# Patient Record
Sex: MALE | Race: WHITE | NOT HISPANIC OR LATINO | Employment: OTHER | URBAN - METROPOLITAN AREA
[De-identification: names, ages, dates, MRNs, and addresses within clinical notes are randomized per-mention and may not be internally consistent; named-entity substitution may affect disease eponyms.]

---

## 2017-01-06 NOTE — PATIENT DISCUSSION
Continue: prednisolone acetate (prednisolone acetate): drops,suspension: 1% 1 drop four times a day into affected eye 12-

## 2017-01-06 NOTE — PATIENT DISCUSSION
RETINA IS ATTACHED OU: NO HOLES OR TEARS SEEN ON  EXAM TODAY.  RETINAL DETACHMENT SIGNS AND SYMPTOMS REVIEWED

## 2017-01-06 NOTE — PATIENT DISCUSSION
POST-OP DAY 1 EXAM: s/p PPV/MEMBRANE PEELING for ERM OS. Doing well today. Retina attached. IOP within acceptable limits. Start eyedrops in the surgical eye as instructed: Pred 4x/day, Cipro 4x/day, Atropine 2x/day. Take tylenol or ibuprofen for any mild eye pain or pressure. Retinal detachment and endophthalmitis precautions reviewed. Instructed to call immediately for worsening vision, eye pain, or eye discharge.

## 2017-01-11 NOTE — PATIENT DISCUSSION
POST-OP WEEK 1 EXAM: s/p PPV/MP for ERM OS. Doing well today. Retina attached. IOP within acceptable limits. Continue eyedrops in the surgical eye as instructed. Begin Pred taper. Discontinue Cipro and Atropine. Retinal detachment and endophthalmitis precautions reviewed. Instructed to call immediately for worsening vision, eye pain, or eye discharge.

## 2017-02-01 NOTE — PATIENT DISCUSSION
POST-OP MONTH 1 EXAM: s/p PPV/MP FOR EPIRETINAL MEMBRANE OS. Doing well today. Retina attached. IOP within acceptable limits. Finish eyedrops in the surgical eye as instructed. Retinal detachment and endophthalmitis precautions reviewed. Instructed to call immediately for worsening vision, eye pain, or eye discharge.

## 2017-02-01 NOTE — PATIENT DISCUSSION
RETINA IS ATTACHED OS: NO HOLES OR TEARS SEEN ON DILATED EXAM TODAY.  RETINAL DETACHMENT SIGNS AND SYMPTOMS REVIEWED

## 2017-05-10 NOTE — PATIENT DISCUSSION
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 PRESCRIBED UV PROTECTION TO MINIMIZE UV EXPOSURE. RETURN AS SCHEDULED FOR EVALUATION AND PHOTO DOCUMENTATION.

## 2017-05-10 NOTE — PATIENT DISCUSSION
EPIMACULAR MEMBRANE, OD:  PATIENT NOT FUNCTIONALLY BOTHERED. SURGERY NOT RECOMMENDED AT THIS TIME. RETURN AS SCHEDULED FOR OCT / EVALUATION.

## 2017-05-10 NOTE — PATIENT DISCUSSION
Choroidal Nevus Counseling:  Choroidal nevi will rarely turn into malignant melanoma, an aggressive form of cancer. The patient was instructed to return as scheduled for periodic evaluation for any changes in the nevus. Persistent changes in vision should prompt patients to call for re-evaluation.

## 2017-05-10 NOTE — PATIENT DISCUSSION
RETINA IS ATTACHED OU: NO NEW HOLES OR TEARS SEEN ON DILATED EXAM TODAY.  RETINAL DETACHMENT SIGNS AND SYMPTOMS REVIEWED

## 2017-05-10 NOTE — PATIENT DISCUSSION
Epimacular Membrane Counseling: The cause and prognosis of the disease was discussed with the patient at length, including risk of blurred and distorted vision from this condition.

## 2017-11-15 NOTE — PATIENT DISCUSSION
RETINA IS ATTACHED OU: PVD OD;  S/P PPV/MEMBRANE PEELING OS; NO HOLES OR TEARS SEEN ON DILATED EXAM TODAY.  RETINAL DETACHMENT SIGNS AND SYMPTOMS REVIEWED

## 2017-11-15 NOTE — PATIENT DISCUSSION
GLAUCOMA SUSPECT, OS&gt;OD: INCREASED OPTIC NERVE CUPPING OS, SINCE 6 MONTHS AGO. RETURN TO DR BRADFORD FOR GLAUCOMA SCREENING AND VISUAL FIELD 24-2 TESTING.

## 2017-11-15 NOTE — PATIENT DISCUSSION
MACULAR EDEMA OS: IMPROVED SINCE MEMBRANE PEELING SURGERY. CONTINUE DUREZOL 4X/DAY OS PRESCRIBED BY DR BRADFORD. DISCUSSED THAT MACULAR EDEMA VERY MILD AND UNLIKELY TO EXPLAIN DECREASING VISION.

## 2019-08-04 NOTE — PATIENT DISCUSSION
DECREASED VISION OS: NO DEFINITE EXPLANATION FOR DECREASE IN VISION TODAY. EXAM ESSENTIALLY STABLE. TRACE CME OS, BASED ON OCT SCAN.
General:
POST-OP MONTH 3 EXAM: S/P PPV/MP for EPIRETINAL MEMBRANE OS. Doing well today. Retina attached. IOP within acceptable limits. Finish eyedrops in the surgical eye as instructed. Retinal detachment and endophthalmitis precautions reviewed. Instructed to call immediately for worsening vision, eye pain, or eye discharge.
RETURNING TO DR. Hanson Been TOMORROW:  RECOMMEND REFRACTION AND VISUAL FIELD 24-2 TESTING WITH DR. Hanson Been TO ASSESS FOR LOW TENSION GLAUCOMA.
REVIEWED PVD PRE-CAUTIONS WITH PATIENT AND ADVISED PT TO CALL IF EXPERIENCES NEW FLASHES OF LIGHT, INCREASE IN FLOATERS, OR DECREASE VISION IN EITHER EYE.
START TRIAL OF PROLENSA 1X/DAY OS. SAMPLE GIVEN. RETURN 1 MONTH. OCT OU.
negative...

## 2021-05-26 NOTE — PATIENT DISCUSSION
POAG, OU: INTRAOCULAR PRESSURE IS WITHIN ACCEPTABLE LIMITS. PT INSTRUCTED TO CONTINUE LUMIGAN QHS OU; AND RETURN FOR FOLLOW-UP AS SCHEDULED WITH DR Izzy Rai.

## 2021-05-26 NOTE — PATIENT DISCUSSION
Continue: PreserVision AREDS 2 (vit c,y-tb-mfnyr-lutein-zeaxan): capsule: [id number] mg-unit-mg-mg 1 capsule twice a day by mouth

## 2021-05-26 NOTE — PATIENT DISCUSSION
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 DISCUSSED VERY GUARDED PROGNOSIS FOR ANY VISUAL IMPROVEMENT WITH MACULAR HOLE REPAIR SURGERY. RECOMMEND OBSERVATION ONLY. RETURN AS SCHEDULED TO DR Katie BRADFORD TO DISCUSS CLINICAL TRIALS FOR TREATMENT OF GEOGRAPHIC ATROPHY (ZIMURA STUDY).

## 2021-06-15 ENCOUNTER — NEW PATIENT COMPREHENSIVE (OUTPATIENT)
Dept: URBAN - METROPOLITAN AREA CLINIC 32 | Facility: CLINIC | Age: 71
End: 2021-06-15

## 2021-06-15 DIAGNOSIS — H25.13: ICD-10-CM

## 2021-06-15 DIAGNOSIS — H35.363: ICD-10-CM

## 2021-06-15 DIAGNOSIS — H18.413: ICD-10-CM

## 2021-06-15 DIAGNOSIS — H11.823: ICD-10-CM

## 2021-06-15 PROCEDURE — 92004 COMPRE OPH EXAM NEW PT 1/>: CPT

## 2021-06-15 PROCEDURE — 92250 FUNDUS PHOTOGRAPHY W/I&R: CPT

## 2021-06-15 PROCEDURE — 92015 DETERMINE REFRACTIVE STATE: CPT

## 2021-06-15 ASSESSMENT — VISUAL ACUITY
OS_CC: 20/25
OD_CC: 20/30
OD_SC: J3
OD_SC: 20/200
OS_GLARE: 20/100
OS_SC: J3
OD_CC: J1+
OD_GLARE: 20/100
OS_CC: J1+
OS_SC: 20/60

## 2021-06-15 ASSESSMENT — KERATOMETRY
OS_K1POWER_DIOPTERS: 42.50
OD_AXISANGLE2_DEGREES: 154
OS_K2POWER_DIOPTERS: 41.75
OD_K2POWER_DIOPTERS: 41.00
OS_AXISANGLE_DEGREES: 165
OD_AXISANGLE_DEGREES: 64
OS_AXISANGLE2_DEGREES: 75
OD_K1POWER_DIOPTERS: 42.00

## 2021-06-15 ASSESSMENT — TONOMETRY
OS_IOP_MMHG: 12
OD_IOP_MMHG: 10

## 2021-09-15 ASSESSMENT — VISUAL ACUITY
OS_SC: 20/60
OS_CC: 20/25+2
OS_GLARE: 20/100
OD_SC: 20/200
OD_CC: J1+
OS_CC: J1+
OD_GLARE: 20/100
OS_SC: J3
OD_SC: J3
OD_CC: 20/25+2

## 2021-09-15 ASSESSMENT — KERATOMETRY
OD_K2POWER_DIOPTERS: 41.00
OS_K2POWER_DIOPTERS: 41.75
OS_K1POWER_DIOPTERS: 42.50
OD_K1POWER_DIOPTERS: 42.00
OS_AXISANGLE_DEGREES: 165
OD_AXISANGLE_DEGREES: 64
OS_AXISANGLE2_DEGREES: 75
OD_AXISANGLE2_DEGREES: 154

## 2021-09-16 ENCOUNTER — SURGICAL TESTING (OUTPATIENT)
Dept: URBAN - METROPOLITAN AREA CLINIC 32 | Facility: CLINIC | Age: 71
End: 2021-09-16

## 2021-09-16 DIAGNOSIS — H25.12: ICD-10-CM

## 2021-09-16 DIAGNOSIS — H25.11: ICD-10-CM

## 2021-09-16 DIAGNOSIS — H35.363: ICD-10-CM

## 2021-09-16 PROCEDURE — 92134 CPTRZ OPH DX IMG PST SGM RTA: CPT

## 2021-09-16 PROCEDURE — V2799PMN IMPRIMIS PRED-MOXI-NEPAF 5ML

## 2021-09-16 PROCEDURE — 92136TC INTERFEROMETRY - TECHNICAL COMPONENT

## 2021-09-16 PROCEDURE — 92286 ANT SGM IMG I&R SPECLR MIC: CPT

## 2021-09-16 PROCEDURE — 92012 INTRM OPH EXAM EST PATIENT: CPT

## 2021-09-28 ENCOUNTER — SURGERY/PROCEDURE (OUTPATIENT)
Dept: URBAN - METROPOLITAN AREA CLINIC 32 | Facility: CLINIC | Age: 71
End: 2021-09-28

## 2021-09-28 DIAGNOSIS — H25.11: ICD-10-CM

## 2021-09-28 PROCEDURE — 66984 XCAPSL CTRC RMVL W/O ECP: CPT

## 2021-09-29 ENCOUNTER — CATARACT POST-OP 1-DAY (OUTPATIENT)
Dept: URBAN - METROPOLITAN AREA CLINIC 32 | Facility: CLINIC | Age: 71
End: 2021-09-29

## 2021-09-29 DIAGNOSIS — H25.11: ICD-10-CM

## 2021-09-29 DIAGNOSIS — Z96.1: ICD-10-CM

## 2021-09-29 PROCEDURE — 99024 POSTOP FOLLOW-UP VISIT: CPT

## 2021-09-29 ASSESSMENT — KERATOMETRY
OD_K1POWER_DIOPTERS: 42.00
OS_AXISANGLE_DEGREES: 165
OD_AXISANGLE_DEGREES: 64
OS_K2POWER_DIOPTERS: 41.75
OD_K2POWER_DIOPTERS: 41.00
OS_AXISANGLE2_DEGREES: 75
OD_AXISANGLE2_DEGREES: 154
OS_K1POWER_DIOPTERS: 42.50

## 2021-09-29 ASSESSMENT — VISUAL ACUITY
OD_SC: J3-3
OD_SC: 20/30+1

## 2021-09-29 ASSESSMENT — TONOMETRY: OD_IOP_MMHG: 17

## 2021-10-06 ENCOUNTER — POST OP/EVAL OF SECOND EYE (OUTPATIENT)
Dept: URBAN - METROPOLITAN AREA CLINIC 32 | Facility: CLINIC | Age: 71
End: 2021-10-06

## 2021-10-06 DIAGNOSIS — H25.12: ICD-10-CM

## 2021-10-06 PROCEDURE — V2799PMN IMPRIMIS PRED-MOXI-NEPAF 5ML

## 2021-10-06 PROCEDURE — 92012 INTRM OPH EXAM EST PATIENT: CPT

## 2021-10-06 ASSESSMENT — KERATOMETRY
OS_AXISANGLE2_DEGREES: 90
OS_AXISANGLE2_DEGREES: 75
OD_K2POWER_DIOPTERS: 41.00
OD_AXISANGLE_DEGREES: 64
OD_K1POWER_DIOPTERS: 41.50
OD_AXISANGLE2_DEGREES: 154
OD_K2POWER_DIOPTERS: 40.75
OD_AXISANGLE_DEGREES: 64
OD_AXISANGLE2_DEGREES: 154
OD_K1POWER_DIOPTERS: 42.00
OS_K2POWER_DIOPTERS: 41.75
OS_AXISANGLE_DEGREES: 165
OS_K1POWER_DIOPTERS: 42.50

## 2021-10-06 ASSESSMENT — VISUAL ACUITY
OS_GLARE: 20/100
OD_SC: 20/25

## 2021-10-06 ASSESSMENT — TONOMETRY: OD_IOP_MMHG: 14

## 2021-10-12 ENCOUNTER — SURGERY/PROCEDURE (OUTPATIENT)
Dept: URBAN - METROPOLITAN AREA CLINIC 32 | Facility: CLINIC | Age: 71
End: 2021-10-12

## 2021-10-12 DIAGNOSIS — H25.12: ICD-10-CM

## 2021-10-12 PROCEDURE — 66984 XCAPSL CTRC RMVL W/O ECP: CPT

## 2021-10-13 ENCOUNTER — 1 DAY POST-OP (OUTPATIENT)
Dept: URBAN - METROPOLITAN AREA CLINIC 32 | Facility: CLINIC | Age: 71
End: 2021-10-13

## 2021-10-13 DIAGNOSIS — Z96.1: ICD-10-CM

## 2021-10-13 PROCEDURE — 99024 POSTOP FOLLOW-UP VISIT: CPT

## 2021-10-13 ASSESSMENT — TONOMETRY: OS_IOP_MMHG: 14

## 2021-10-13 ASSESSMENT — KERATOMETRY
OD_AXISANGLE2_DEGREES: 154
OD_K1POWER_DIOPTERS: 41.50
OS_AXISANGLE2_DEGREES: 90
OD_K2POWER_DIOPTERS: 40.75
OD_AXISANGLE_DEGREES: 64

## 2021-10-13 ASSESSMENT — VISUAL ACUITY: OS_SC: 20/30-2

## 2021-10-15 ASSESSMENT — KERATOMETRY
OS_AXISANGLE2_DEGREES: 90
OD_K2POWER_DIOPTERS: 40.75
OD_AXISANGLE_DEGREES: 64
OD_K1POWER_DIOPTERS: 41.50
OD_AXISANGLE2_DEGREES: 154

## 2021-10-20 ENCOUNTER — POST-OP CATARACT (OUTPATIENT)
Dept: URBAN - METROPOLITAN AREA CLINIC 32 | Facility: CLINIC | Age: 71
End: 2021-10-20

## 2021-10-20 DIAGNOSIS — Z96.1: ICD-10-CM

## 2021-10-20 PROCEDURE — 99024 POSTOP FOLLOW-UP VISIT: CPT

## 2021-10-20 PROCEDURE — 92015 DETERMINE REFRACTIVE STATE: CPT

## 2021-10-20 ASSESSMENT — TONOMETRY: OS_IOP_MMHG: 12

## 2021-10-20 ASSESSMENT — VISUAL ACUITY: OS_SC: 20/30+1

## 2021-11-10 ENCOUNTER — POST-OP CATARACT (OUTPATIENT)
Dept: URBAN - METROPOLITAN AREA CLINIC 32 | Facility: CLINIC | Age: 71
End: 2021-11-10

## 2021-11-10 DIAGNOSIS — H11.31: ICD-10-CM

## 2021-11-10 DIAGNOSIS — Z96.1: ICD-10-CM

## 2021-11-10 PROCEDURE — 92015 DETERMINE REFRACTIVE STATE: CPT

## 2021-11-10 PROCEDURE — 99024 POSTOP FOLLOW-UP VISIT: CPT

## 2021-11-10 ASSESSMENT — TONOMETRY
OD_IOP_MMHG: 9
OS_IOP_MMHG: 11

## 2021-11-10 ASSESSMENT — VISUAL ACUITY
OS_SC: J2
OS_SC: 20/25-1
OD_SC: J2
OD_SC: 20/25-2

## 2021-11-11 ENCOUNTER — CONSULT (OUTPATIENT)
Dept: URBAN - METROPOLITAN AREA CLINIC 32 | Facility: CLINIC | Age: 71
End: 2021-11-11

## 2021-11-11 DIAGNOSIS — H02.831: ICD-10-CM

## 2021-11-11 DIAGNOSIS — H02.834: ICD-10-CM

## 2021-11-11 DIAGNOSIS — H02.403: ICD-10-CM

## 2021-11-11 PROCEDURE — 92012 INTRM OPH EXAM EST PATIENT: CPT

## 2021-11-11 PROCEDURE — 92285 EXTERNAL OCULAR PHOTOGRAPHY: CPT

## 2021-11-11 PROCEDURE — 92081 LIMITED VISUAL FIELD XM: CPT

## 2021-11-11 ASSESSMENT — VISUAL ACUITY
OD_SC: 20/20-3
OS_SC: 20/30-2

## 2021-12-16 ENCOUNTER — SURGERY/PROCEDURE (OUTPATIENT)
Dept: URBAN - METROPOLITAN AREA EYE CENTER 3 | Facility: EYE CENTER | Age: 71
End: 2021-12-16

## 2021-12-16 DIAGNOSIS — H02.834: ICD-10-CM

## 2021-12-16 DIAGNOSIS — H02.831: ICD-10-CM

## 2021-12-16 PROCEDURE — 1582350 UPPER BLEPH PER EYE FUNCTIONAL-BILATERAL

## 2021-12-17 ENCOUNTER — POST-OP (OUTPATIENT)
Dept: URBAN - METROPOLITAN AREA CLINIC 32 | Facility: CLINIC | Age: 71
End: 2021-12-17

## 2021-12-17 DIAGNOSIS — Z98.890: ICD-10-CM

## 2021-12-17 PROCEDURE — 99024 POSTOP FOLLOW-UP VISIT: CPT

## 2021-12-22 ASSESSMENT — VISUAL ACUITY
OS_SC: 20/40
OD_SC: 20/30

## 2021-12-23 ENCOUNTER — POST-OP (OUTPATIENT)
Dept: URBAN - METROPOLITAN AREA CLINIC 32 | Facility: CLINIC | Age: 71
End: 2021-12-23

## 2021-12-23 DIAGNOSIS — Z98.890: ICD-10-CM

## 2021-12-23 PROCEDURE — 99024 POSTOP FOLLOW-UP VISIT: CPT

## 2021-12-23 ASSESSMENT — TONOMETRY
OD_IOP_MMHG: 10
OS_IOP_MMHG: 11

## 2021-12-23 ASSESSMENT — VISUAL ACUITY
OD_SC: 20/30
OS_SC: 20/40

## 2022-06-23 ENCOUNTER — COMPREHENSIVE EXAM (OUTPATIENT)
Dept: URBAN - METROPOLITAN AREA CLINIC 32 | Facility: CLINIC | Age: 72
End: 2022-06-23

## 2022-06-23 DIAGNOSIS — H04.123: ICD-10-CM

## 2022-06-23 DIAGNOSIS — H02.403: ICD-10-CM

## 2022-06-23 DIAGNOSIS — H26.491: ICD-10-CM

## 2022-06-23 DIAGNOSIS — H43.813: ICD-10-CM

## 2022-06-23 DIAGNOSIS — H11.823: ICD-10-CM

## 2022-06-23 DIAGNOSIS — H35.363: ICD-10-CM

## 2022-06-23 PROCEDURE — 99214 OFFICE O/P EST MOD 30 MIN: CPT

## 2022-06-23 ASSESSMENT — VISUAL ACUITY
OS_SC: 20/30-2
OS_SC: 20/40
OD_SC: 20/40
OD_SC: 20/25-1
OD_GLARE: 20/50

## 2022-06-23 ASSESSMENT — TONOMETRY
OD_IOP_MMHG: 10
OS_IOP_MMHG: 11

## 2022-06-23 ASSESSMENT — KERATOMETRY
OS_K1POWER_DIOPTERS: 42.00
OS_K2POWER_DIOPTERS: 41.00
OS_AXISANGLE_DEGREES: 145
OS_AXISANGLE2_DEGREES: 55
OD_K2POWER_DIOPTERS: 41.00
OD_AXISANGLE2_DEGREES: 140
OD_AXISANGLE_DEGREES: 50
OD_K1POWER_DIOPTERS: 41.75

## 2022-07-19 ENCOUNTER — SURGERY/PROCEDURE (OUTPATIENT)
Dept: URBAN - METROPOLITAN AREA CLINIC 32 | Facility: CLINIC | Age: 72
End: 2022-07-19

## 2022-07-19 DIAGNOSIS — H26.491: ICD-10-CM

## 2022-07-19 PROCEDURE — 66821 AFTER CATARACT LASER SURGERY: CPT

## 2022-07-21 ASSESSMENT — KERATOMETRY
OS_K2POWER_DIOPTERS: 41.00
OD_AXISANGLE_DEGREES: 50
OD_AXISANGLE2_DEGREES: 140
OS_AXISANGLE2_DEGREES: 55
OD_K1POWER_DIOPTERS: 41.75
OS_K1POWER_DIOPTERS: 42.00
OS_AXISANGLE_DEGREES: 145
OD_K2POWER_DIOPTERS: 41.00

## 2022-08-02 ENCOUNTER — POST-OP (OUTPATIENT)
Dept: URBAN - METROPOLITAN AREA CLINIC 32 | Facility: CLINIC | Age: 72
End: 2022-08-02

## 2022-08-02 DIAGNOSIS — Z98.890: ICD-10-CM

## 2022-08-02 DIAGNOSIS — H04.123: ICD-10-CM

## 2022-08-02 PROCEDURE — 99024 POSTOP FOLLOW-UP VISIT: CPT

## 2022-08-02 ASSESSMENT — KERATOMETRY
OD_K2POWER_DIOPTERS: 41.00
OS_AXISANGLE2_DEGREES: 55
OS_K1POWER_DIOPTERS: 42.00
OD_AXISANGLE_DEGREES: 50
OS_AXISANGLE_DEGREES: 145
OD_AXISANGLE2_DEGREES: 140
OD_K1POWER_DIOPTERS: 41.75
OS_K2POWER_DIOPTERS: 41.00

## 2022-08-02 ASSESSMENT — VISUAL ACUITY
OS_SC: 20/30+2
OD_SC: 20/25-2

## 2022-08-02 ASSESSMENT — TONOMETRY
OD_IOP_MMHG: 7
OS_IOP_MMHG: 10

## 2023-06-26 ENCOUNTER — COMPREHENSIVE EXAM (OUTPATIENT)
Dept: URBAN - METROPOLITAN AREA CLINIC 32 | Facility: CLINIC | Age: 73
End: 2023-06-26

## 2023-06-26 DIAGNOSIS — H35.363: ICD-10-CM

## 2023-06-26 DIAGNOSIS — H04.123: ICD-10-CM

## 2023-06-26 DIAGNOSIS — H26.492: ICD-10-CM

## 2023-06-26 DIAGNOSIS — H43.813: ICD-10-CM

## 2023-06-26 PROCEDURE — 92015 DETERMINE REFRACTIVE STATE: CPT

## 2023-06-26 PROCEDURE — 99214 OFFICE O/P EST MOD 30 MIN: CPT

## 2023-06-26 PROCEDURE — 92134 CPTRZ OPH DX IMG PST SGM RTA: CPT

## 2023-06-26 ASSESSMENT — KERATOMETRY
OS_AXISANGLE_DEGREES: 115
OS_AXISANGLE2_DEGREES: 25
OD_AXISANGLE2_DEGREES: 143
OS_K2POWER_DIOPTERS: 41.50
OD_AXISANGLE_DEGREES: 53
OD_K2POWER_DIOPTERS: 41.25
OS_K1POWER_DIOPTERS: 42.25
OD_K1POWER_DIOPTERS: 41.75

## 2023-06-26 ASSESSMENT — TONOMETRY
OD_IOP_MMHG: 11
OS_IOP_MMHG: 11

## 2023-06-27 ASSESSMENT — VISUAL ACUITY
OD_SC: 20/30-1
OS_SC: J2-2
OD_SC: J2
OS_GLARE: 20/60
OS_SC: 20/30-1

## 2025-08-15 ENCOUNTER — COMPREHENSIVE EXAM (OUTPATIENT)
Age: 75
End: 2025-08-15

## 2025-08-15 DIAGNOSIS — H26.492: ICD-10-CM

## 2025-08-15 DIAGNOSIS — H35.363: ICD-10-CM

## 2025-08-15 DIAGNOSIS — H43.813: ICD-10-CM

## 2025-08-15 DIAGNOSIS — H04.123: ICD-10-CM

## 2025-08-15 PROCEDURE — 99214 OFFICE O/P EST MOD 30 MIN: CPT

## 2025-08-25 ENCOUNTER — SURGERY/PROCEDURE (OUTPATIENT)
Age: 75
End: 2025-08-25

## 2025-08-25 DIAGNOSIS — H26.492: ICD-10-CM

## 2025-08-25 PROCEDURE — 66821 AFTER CATARACT LASER SURGERY: CPT
